# Patient Record
Sex: MALE | Race: WHITE | NOT HISPANIC OR LATINO | URBAN - METROPOLITAN AREA
[De-identification: names, ages, dates, MRNs, and addresses within clinical notes are randomized per-mention and may not be internally consistent; named-entity substitution may affect disease eponyms.]

---

## 2023-08-07 ENCOUNTER — HOSPITAL ENCOUNTER (OUTPATIENT)
Dept: DATA CONVERSION | Facility: HOSPITAL | Age: 68
Discharge: HOME | End: 2023-08-07

## 2023-08-07 ENCOUNTER — HOSPITAL ENCOUNTER (OUTPATIENT)
Dept: DATA CONVERSION | Facility: HOSPITAL | Age: 68
End: 2023-08-07

## 2023-08-07 DIAGNOSIS — M48.062 SPINAL STENOSIS, LUMBAR REGION WITH NEUROGENIC CLAUDICATION: ICD-10-CM

## 2025-01-16 ENCOUNTER — DOCUMENTATION (OUTPATIENT)
Dept: PHYSICAL THERAPY | Facility: CLINIC | Age: 70
End: 2025-01-16
Payer: MEDICARE

## 2025-01-16 NOTE — PROGRESS NOTES
Physical Therapy                 Therapy Communication Note    Patient Name: Anup Snow  MRN: [id number]  Encounter Date: 1/16/2025    Discipline: Physical Therapy    Missed Time: No Show    Comment:

## 2025-03-19 ENCOUNTER — APPOINTMENT (OUTPATIENT)
Dept: ORTHOPEDIC SURGERY | Facility: CLINIC | Age: 70
End: 2025-03-19
Payer: MEDICARE

## 2025-03-19 DIAGNOSIS — G95.9 CERVICAL MYELOPATHY: ICD-10-CM

## 2025-03-19 DIAGNOSIS — Z98.1 STATUS POST CERVICAL SPINAL FUSION: Primary | ICD-10-CM

## 2025-03-19 PROCEDURE — 1125F AMNT PAIN NOTED PAIN PRSNT: CPT | Performed by: ORTHOPAEDIC SURGERY

## 2025-03-19 PROCEDURE — 1159F MED LIST DOCD IN RCRD: CPT | Performed by: ORTHOPAEDIC SURGERY

## 2025-03-19 PROCEDURE — 1160F RVW MEDS BY RX/DR IN RCRD: CPT | Performed by: ORTHOPAEDIC SURGERY

## 2025-03-19 PROCEDURE — 1036F TOBACCO NON-USER: CPT | Performed by: ORTHOPAEDIC SURGERY

## 2025-03-19 RX ORDER — VALSARTAN 160 MG/1
160 TABLET ORAL DAILY
COMMUNITY

## 2025-03-19 RX ORDER — ACETAMINOPHEN 500 MG
1000 TABLET ORAL EVERY 8 HOURS PRN
COMMUNITY
Start: 2024-05-28

## 2025-03-19 RX ORDER — AMLODIPINE BESYLATE 10 MG/1
10 TABLET ORAL
COMMUNITY
Start: 2025-02-12 | End: 2025-05-13

## 2025-03-19 ASSESSMENT — PAIN - FUNCTIONAL ASSESSMENT: PAIN_FUNCTIONAL_ASSESSMENT: 0-10

## 2025-03-19 ASSESSMENT — ENCOUNTER SYMPTOMS
LOSS OF SENSATION IN FEET: 1
OCCASIONAL FEELINGS OF UNSTEADINESS: 0
DEPRESSION: 1

## 2025-03-19 ASSESSMENT — PAIN SCALES - GENERAL: PAINLEVEL_OUTOF10: 7

## 2025-03-19 ASSESSMENT — PAIN DESCRIPTION - DESCRIPTORS: DESCRIPTORS: ACHING;BURNING;RADIATING

## 2025-03-19 NOTE — PROGRESS NOTES
69-year-old male who made an appointment to see a spine specialist.  He presents a very disorganized history of years of varying pains in his neck and arms and back and legs and numbness.  He had surgery at [facility] on December 23.  My review of the records identify it as an ACDF at C5-6 by .  He also had a kyphoplasty at [facility] on January 9 with interventional radiology.  The patient has no understanding of what operations he had done or why.    The patient arrived late for his appointment and was immediately angry and verbally abusive to the staff.  He came up in a wheelchair aided by a volunteer but he admits that he drove himself to the hospital and he is fine driving.    He expected that I would have access to all of his medical information and his imaging and be able to give him a second opinion.    I pointed out that I have no access to the imaging of his MRIs, x-rays, CAT scans of his spine.  I strongly advised him to return to the surgeon who performed his surgery and the medical team supporting it.  I declined to take over his medical care or provide an opinion on his care.

## 2025-04-15 ENCOUNTER — HOSPITAL ENCOUNTER (EMERGENCY)
Facility: HOSPITAL | Age: 70
Discharge: HOME | End: 2025-04-16
Attending: STUDENT IN AN ORGANIZED HEALTH CARE EDUCATION/TRAINING PROGRAM
Payer: MEDICARE

## 2025-04-15 DIAGNOSIS — R03.0 ELEVATED BLOOD PRESSURE READING: ICD-10-CM

## 2025-04-15 DIAGNOSIS — F41.9 ANXIETY: Primary | ICD-10-CM

## 2025-04-15 PROCEDURE — 99284 EMERGENCY DEPT VISIT MOD MDM: CPT | Performed by: STUDENT IN AN ORGANIZED HEALTH CARE EDUCATION/TRAINING PROGRAM

## 2025-04-16 ENCOUNTER — APPOINTMENT (OUTPATIENT)
Dept: CARDIOLOGY | Facility: HOSPITAL | Age: 70
End: 2025-04-16
Payer: MEDICARE

## 2025-04-16 VITALS
HEART RATE: 67 BPM | HEIGHT: 71 IN | OXYGEN SATURATION: 97 % | WEIGHT: 216.27 LBS | SYSTOLIC BLOOD PRESSURE: 176 MMHG | RESPIRATION RATE: 18 BRPM | BODY MASS INDEX: 30.28 KG/M2 | DIASTOLIC BLOOD PRESSURE: 87 MMHG | TEMPERATURE: 97 F

## 2025-04-16 LAB
ALBUMIN SERPL BCP-MCNC: 4.5 G/DL (ref 3.4–5)
ALP SERPL-CCNC: 76 U/L (ref 33–136)
ALT SERPL W P-5'-P-CCNC: 20 U/L (ref 10–52)
AMPHETAMINES UR QL SCN: NORMAL
ANION GAP SERPL CALCULATED.3IONS-SCNC: 12 MMOL/L (ref 10–20)
APAP SERPL-MCNC: <10 UG/ML
APPEARANCE UR: CLEAR
AST SERPL W P-5'-P-CCNC: 18 U/L (ref 9–39)
ATRIAL RATE: 62 BPM
BARBITURATES UR QL SCN: NORMAL
BASOPHILS # BLD AUTO: 0.08 X10*3/UL (ref 0–0.1)
BASOPHILS NFR BLD AUTO: 0.8 %
BENZODIAZ UR QL SCN: NORMAL
BILIRUB SERPL-MCNC: 0.5 MG/DL (ref 0–1.2)
BILIRUB UR STRIP.AUTO-MCNC: NEGATIVE MG/DL
BUN SERPL-MCNC: 10 MG/DL (ref 6–23)
BZE UR QL SCN: NORMAL
CALCIUM SERPL-MCNC: 9.4 MG/DL (ref 8.6–10.3)
CANNABINOIDS UR QL SCN: NORMAL
CARDIAC TROPONIN I PNL SERPL HS: 8 NG/L (ref 0–20)
CARDIAC TROPONIN I PNL SERPL HS: 9 NG/L (ref 0–20)
CHLORIDE SERPL-SCNC: 101 MMOL/L (ref 98–107)
CO2 SERPL-SCNC: 29 MMOL/L (ref 21–32)
COLOR UR: COLORLESS
CREAT SERPL-MCNC: 0.83 MG/DL (ref 0.5–1.3)
EGFRCR SERPLBLD CKD-EPI 2021: >90 ML/MIN/1.73M*2
EOSINOPHIL # BLD AUTO: 0.13 X10*3/UL (ref 0–0.7)
EOSINOPHIL NFR BLD AUTO: 1.4 %
ERYTHROCYTE [DISTWIDTH] IN BLOOD BY AUTOMATED COUNT: 12.2 % (ref 11.5–14.5)
ETHANOL SERPL-MCNC: <10 MG/DL
FENTANYL+NORFENTANYL UR QL SCN: NORMAL
GLUCOSE SERPL-MCNC: 89 MG/DL (ref 74–99)
GLUCOSE UR STRIP.AUTO-MCNC: NORMAL MG/DL
HCT VFR BLD AUTO: 49.8 % (ref 41–52)
HGB BLD-MCNC: 16.7 G/DL (ref 13.5–17.5)
IMM GRANULOCYTES # BLD AUTO: 0.04 X10*3/UL (ref 0–0.7)
IMM GRANULOCYTES NFR BLD AUTO: 0.4 % (ref 0–0.9)
KETONES UR STRIP.AUTO-MCNC: NEGATIVE MG/DL
LEUKOCYTE ESTERASE UR QL STRIP.AUTO: NEGATIVE
LYMPHOCYTES # BLD AUTO: 3.08 X10*3/UL (ref 1.2–4.8)
LYMPHOCYTES NFR BLD AUTO: 32.4 %
MCH RBC QN AUTO: 31.4 PG (ref 26–34)
MCHC RBC AUTO-ENTMCNC: 33.5 G/DL (ref 32–36)
MCV RBC AUTO: 94 FL (ref 80–100)
METHADONE UR QL SCN: NORMAL
MONOCYTES # BLD AUTO: 1.07 X10*3/UL (ref 0.1–1)
MONOCYTES NFR BLD AUTO: 11.3 %
NEUTROPHILS # BLD AUTO: 5.11 X10*3/UL (ref 1.2–7.7)
NEUTROPHILS NFR BLD AUTO: 53.7 %
NITRITE UR QL STRIP.AUTO: NEGATIVE
NRBC BLD-RTO: 0 /100 WBCS (ref 0–0)
OPIATES UR QL SCN: NORMAL
OXYCODONE+OXYMORPHONE UR QL SCN: NORMAL
P AXIS: 59 DEGREES
P OFFSET: 197 MS
P ONSET: 152 MS
PCP UR QL SCN: NORMAL
PH UR STRIP.AUTO: 7 [PH]
PLATELET # BLD AUTO: 284 X10*3/UL (ref 150–450)
POTASSIUM SERPL-SCNC: 3.8 MMOL/L (ref 3.5–5.3)
PR INTERVAL: 130 MS
PROT SERPL-MCNC: 7 G/DL (ref 6.4–8.2)
PROT UR STRIP.AUTO-MCNC: NEGATIVE MG/DL
Q ONSET: 217 MS
QRS COUNT: 10 BEATS
QRS DURATION: 86 MS
QT INTERVAL: 414 MS
QTC CALCULATION(BAZETT): 420 MS
QTC FREDERICIA: 418 MS
R AXIS: 13 DEGREES
RBC # BLD AUTO: 5.32 X10*6/UL (ref 4.5–5.9)
RBC # UR STRIP.AUTO: NEGATIVE MG/DL
SALICYLATES SERPL-MCNC: <3 MG/DL
SODIUM SERPL-SCNC: 138 MMOL/L (ref 136–145)
SP GR UR STRIP.AUTO: 1.01
T AXIS: 39 DEGREES
T OFFSET: 424 MS
TSH SERPL-ACNC: 2.49 MIU/L (ref 0.44–3.98)
UROBILINOGEN UR STRIP.AUTO-MCNC: NORMAL MG/DL
VENTRICULAR RATE: 62 BPM
WBC # BLD AUTO: 9.5 X10*3/UL (ref 4.4–11.3)

## 2025-04-16 PROCEDURE — 80053 COMPREHEN METABOLIC PANEL: CPT | Performed by: STUDENT IN AN ORGANIZED HEALTH CARE EDUCATION/TRAINING PROGRAM

## 2025-04-16 PROCEDURE — 84484 ASSAY OF TROPONIN QUANT: CPT | Performed by: STUDENT IN AN ORGANIZED HEALTH CARE EDUCATION/TRAINING PROGRAM

## 2025-04-16 PROCEDURE — 2500000001 HC RX 250 WO HCPCS SELF ADMINISTERED DRUGS (ALT 637 FOR MEDICARE OP): Performed by: STUDENT IN AN ORGANIZED HEALTH CARE EDUCATION/TRAINING PROGRAM

## 2025-04-16 PROCEDURE — 36415 COLL VENOUS BLD VENIPUNCTURE: CPT | Performed by: STUDENT IN AN ORGANIZED HEALTH CARE EDUCATION/TRAINING PROGRAM

## 2025-04-16 PROCEDURE — 2500000001 HC RX 250 WO HCPCS SELF ADMINISTERED DRUGS (ALT 637 FOR MEDICARE OP)

## 2025-04-16 PROCEDURE — 80320 DRUG SCREEN QUANTALCOHOLS: CPT | Performed by: STUDENT IN AN ORGANIZED HEALTH CARE EDUCATION/TRAINING PROGRAM

## 2025-04-16 PROCEDURE — 2500000002 HC RX 250 W HCPCS SELF ADMINISTERED DRUGS (ALT 637 FOR MEDICARE OP, ALT 636 FOR OP/ED): Performed by: STUDENT IN AN ORGANIZED HEALTH CARE EDUCATION/TRAINING PROGRAM

## 2025-04-16 PROCEDURE — 85025 COMPLETE CBC W/AUTO DIFF WBC: CPT | Performed by: STUDENT IN AN ORGANIZED HEALTH CARE EDUCATION/TRAINING PROGRAM

## 2025-04-16 PROCEDURE — 81003 URINALYSIS AUTO W/O SCOPE: CPT | Mod: 59 | Performed by: STUDENT IN AN ORGANIZED HEALTH CARE EDUCATION/TRAINING PROGRAM

## 2025-04-16 PROCEDURE — 80307 DRUG TEST PRSMV CHEM ANLYZR: CPT | Performed by: STUDENT IN AN ORGANIZED HEALTH CARE EDUCATION/TRAINING PROGRAM

## 2025-04-16 PROCEDURE — 84443 ASSAY THYROID STIM HORMONE: CPT | Performed by: STUDENT IN AN ORGANIZED HEALTH CARE EDUCATION/TRAINING PROGRAM

## 2025-04-16 PROCEDURE — 93005 ELECTROCARDIOGRAM TRACING: CPT

## 2025-04-16 RX ORDER — HYDROXYZINE HYDROCHLORIDE 25 MG/1
25 TABLET, FILM COATED ORAL EVERY 6 HOURS PRN
Status: DISCONTINUED | OUTPATIENT
Start: 2025-04-16 | End: 2025-04-16 | Stop reason: HOSPADM

## 2025-04-16 RX ORDER — NAPROXEN SODIUM 220 MG/1
TABLET, FILM COATED ORAL
Status: COMPLETED
Start: 2025-04-16 | End: 2025-04-16

## 2025-04-16 RX ORDER — HYDROCHLOROTHIAZIDE 25 MG/1
25 TABLET ORAL ONCE
Status: COMPLETED | OUTPATIENT
Start: 2025-04-16 | End: 2025-04-16

## 2025-04-16 RX ORDER — AMLODIPINE BESYLATE 5 MG/1
10 TABLET ORAL ONCE
Status: COMPLETED | OUTPATIENT
Start: 2025-04-16 | End: 2025-04-16

## 2025-04-16 RX ORDER — LORAZEPAM 1 MG/1
2 TABLET ORAL ONCE
Status: COMPLETED | OUTPATIENT
Start: 2025-04-16 | End: 2025-04-16

## 2025-04-16 RX ORDER — VALSARTAN 80 MG/1
160 TABLET ORAL ONCE
Status: COMPLETED | OUTPATIENT
Start: 2025-04-16 | End: 2025-04-16

## 2025-04-16 RX ADMIN — AMLODIPINE BESYLATE 10 MG: 5 TABLET ORAL at 07:29

## 2025-04-16 RX ADMIN — HYDROCHLOROTHIAZIDE 25 MG: 25 TABLET ORAL at 07:29

## 2025-04-16 RX ADMIN — VALSARTAN 160 MG: 80 TABLET, FILM COATED ORAL at 07:29

## 2025-04-16 RX ADMIN — ASPIRIN 81 MG CHEWABLE TABLET: 81 TABLET CHEWABLE at 02:34

## 2025-04-16 RX ADMIN — LORAZEPAM 2 MG: 1 TABLET ORAL at 00:39

## 2025-04-16 SDOH — HEALTH STABILITY: MENTAL HEALTH: WISH TO BE DEAD (PAST 1 MONTH): NO

## 2025-04-16 SDOH — ECONOMIC STABILITY: HOUSING INSECURITY: FEELS SAFE LIVING IN HOME: YES

## 2025-04-16 SDOH — HEALTH STABILITY: MENTAL HEALTH: IN THE PAST FEW WEEKS, HAVE YOU WISHED YOU WERE DEAD?: NO

## 2025-04-16 SDOH — HEALTH STABILITY: MENTAL HEALTH: SUICIDAL BEHAVIOR (LIFETIME): NO

## 2025-04-16 SDOH — HEALTH STABILITY: MENTAL HEALTH: HAVE YOU EVER TRIED TO KILL YOURSELF?: NO

## 2025-04-16 SDOH — HEALTH STABILITY: MENTAL HEALTH: NON-SPECIFIC ACTIVE SUICIDAL THOUGHTS (PAST 1 MONTH): NO

## 2025-04-16 SDOH — HEALTH STABILITY: MENTAL HEALTH: ARE YOU HAVING THOUGHTS OF KILLING YOURSELF RIGHT NOW?: NO

## 2025-04-16 SDOH — HEALTH STABILITY: MENTAL HEALTH: IN THE PAST FEW WEEKS, HAVE YOU FELT THAT YOU OR YOUR FAMILY WOULD BE BETTER OFF IF YOU WERE DEAD?: NO

## 2025-04-16 SDOH — HEALTH STABILITY: MENTAL HEALTH: DEPRESSION SYMPTOMS: CHANGE IN ENERGY LEVEL

## 2025-04-16 SDOH — HEALTH STABILITY: MENTAL HEALTH: IN THE PAST WEEK, HAVE YOU BEEN HAVING THOUGHTS ABOUT KILLING YOURSELF?: NO

## 2025-04-16 SDOH — HEALTH STABILITY: MENTAL HEALTH: ANXIETY SYMPTOMS: GENERALIZED

## 2025-04-16 ASSESSMENT — LIFESTYLE VARIABLES
SUBSTANCE_ABUSE_PAST_12_MONTHS: NO
PRESCIPTION_ABUSE_PAST_12_MONTHS: NO

## 2025-04-16 ASSESSMENT — PAIN SCALES - GENERAL: PAINLEVEL_OUTOF10: 0 - NO PAIN

## 2025-04-16 ASSESSMENT — COLUMBIA-SUICIDE SEVERITY RATING SCALE - C-SSRS
6. HAVE YOU EVER DONE ANYTHING, STARTED TO DO ANYTHING, OR PREPARED TO DO ANYTHING TO END YOUR LIFE?: NO
2. HAVE YOU ACTUALLY HAD ANY THOUGHTS OF KILLING YOURSELF?: NO
1. IN THE PAST MONTH, HAVE YOU WISHED YOU WERE DEAD OR WISHED YOU COULD GO TO SLEEP AND NOT WAKE UP?: NO

## 2025-04-16 ASSESSMENT — PAIN - FUNCTIONAL ASSESSMENT: PAIN_FUNCTIONAL_ASSESSMENT: 0-10

## 2025-04-16 NOTE — ED PROVIDER NOTES
EMERGENCY DEPARTMENT ENCOUNTER      Pt Name: Anup Snow  MRN: [id number]  Birthdate 1955  Date of evaluation: 4/15/2025  Provider: January Baptiste DO         Chief Complaint   Patient presents with    Hypertension     Per patient he states that he wasn't feeling that great and decided to take his bp at home. It was runing about 240 systolically. Patient was concerned and wanted to come in. Patient denies any pain or other complaints at this time.        HPI     69-year-old male with past medical history significant for prior back pain and hypertension it has been difficult to control although he has been attempting this with his primary care provider, states that he is having worsening stress and anxiety because he has been unable to care for his wife at home and she had to go into a nursing facility, and he does not have a lot of social support from his kids or other help at home.  He endorses significant anxiety, and significant thoughts of guilt and hopelessness.  He also endorses waxing and waning chest pains and chest tightness and severe anxiety particularly over the last 5 or 6 days.  It is associated with nausea but he has no fevers, no coughing, no nasal congestion, no difficulty breathing.  He is peeing and pooping normally.              Patient History   No past medical history on file.  No past surgical history on file.  No family history on file.  Social History     Tobacco Use    Smoking status: Never    Smokeless tobacco: Never   Vaping Use    Vaping status: Not on file   Substance Use Topics    Alcohol use: Defer    Drug use: Not on file       Physical Exam   ED Triage Vitals   Temperature Heart Rate Respirations BP   04/15/25 2348 04/15/25 2348 04/15/25 2348 04/15/25 2351   36.1 °C (97 °F) 69 15 (!) 234/112      Pulse Ox Temp Source Heart Rate Source Patient Position   04/15/25 2348 04/15/25 2348 04/15/25 2348 04/15/25 2348   98 % Skin Monitor Sitting      BP Location FiO2 (%)     04/15/25  2348 --     Left arm        Physical Exam  Vitals and nursing note reviewed.   Constitutional:       General: He is not in acute distress.     Appearance: He is well-developed.   HENT:      Head: Normocephalic and atraumatic.   Eyes:      Conjunctiva/sclera: Conjunctivae normal.   Cardiovascular:      Rate and Rhythm: Normal rate and regular rhythm.      Heart sounds: No murmur heard.  Pulmonary:      Effort: Pulmonary effort is normal. No respiratory distress.      Breath sounds: Normal breath sounds.   Abdominal:      Palpations: Abdomen is soft.      Tenderness: There is no abdominal tenderness.   Musculoskeletal:         General: No swelling.      Cervical back: Neck supple.   Skin:     General: Skin is warm and dry.      Capillary Refill: Capillary refill takes less than 2 seconds.   Neurological:      General: No focal deficit present.      Mental Status: He is alert and oriented to person, place, and time.   Psychiatric:         Mood and Affect: Affect is tearful.         Results:  Abnormal Labs Reviewed   CBC WITH AUTO DIFFERENTIAL - Abnormal; Notable for the following components:       Result Value    Monocytes Absolute 1.07 (*)     All other components within normal limits   URINALYSIS WITH REFLEX CULTURE AND MICROSCOPIC - Abnormal; Notable for the following components:    Color, Urine Colorless (*)     All other components within normal limits       All other labs were normal or not returned as of the time of this dictation.     No orders to display         ED Course & MDM   Anpu Snow is a 69 y.o. male presenting to the ED for evaluation of had concerns including Hypertension (Per patient he states that he wasn't feeling that great and decided to take his bp at home. It was runing about 240 systolically. Patient was concerned and wanted to come in. Patient denies any pain or other complaints at this time. ).. External records reviewed: I reviewed external records including outpatient, PCP records,  and prior discharge summaries.    Medical Decision Making  Hypertensive on arrival, 234/112, despite reported med compliance, suspect anxiety may be a component, cardiac workup initiated, the patient was given 2 of oral Ativan, urine drug screen obtained and tox screen obtained and EPAT was consulted.         ED Course as of 04/16/25 0432   Wed Apr 16, 2025   0234 Troponin I, High Sensitivity: 8 [AS]      ED Course User Index  [AS] January Baptiste, DO           Procedure  Procedures            January Baptiste, DO  Emergency Medicine    The above documentation was completed with the use of speech recognition software. It may contain dictation errors secondary to limitations of the software.      ED Medications administered this visit:    Medications   LORazepam (Ativan) tablet 2 mg (2 mg oral Given 4/16/25 0039)       New Prescriptions from this visit:    New Prescriptions    No medications on file       Final Impression: No diagnosis found.      (Please note that portions of this note were completed with a voice recognition program.  Efforts were made to edit the dictations but occasionally words are mis-transcribed.)   with a voice recognition program.  Efforts were made to edit the dictations but occasionally words are mis-transcribed.)     January Baptiste DO  04/29/25 0701

## 2025-04-16 NOTE — PROGRESS NOTES
"EPAT - Social Work Psychiatric Assessment    Arrival Details  Admission Source: Home  Admission Type: Voluntary  EPAT Assessment Start Date: 04/16/25  EPAT Assessment Start Time: 0851  Name of : AMNA Pope    History of Present Illness  Admission Reason: Pt is a 68y/o male presenting [facility] ED for anxiety and hypertension.  HPI: Pt chart, triage and provider notes reviewed prior to assessment, triage assessment reflects \"no risk indicated\". Pt presented to ED after not feeling well yesterday and taking his blood pressure medication. Pt voiced increased anxiety due to his wife recently going to a  SNF. Pt denies any MH history. Pt denies SI/HI/VH/AH.    SW Readmission Information   Readmission within 30 Days: No    Psychiatric Symptoms  Anxiety Symptoms: Generalized  Depression Symptoms: Change in energy level  Amee Symptoms: No problems reported or observed.    Psychosis Symptoms  Hallucination Type: No problems reported or observed.  Delusion Type: No problems reported or observed.    Additional Symptoms - Adult  Generalized Anxiety Disorder: Difficult to control worry, Difficulity concentrating, Irritability, Sleep disturbance, Excessive anxiety/worry  Obsessive Compulsive Disorder: No problems reported or observed.  Panic Attack: No problems reported or observed.  Post Traumatic Stress Disorder: No problems reported or observed.  Delirium: No problems reported or observed.    Past Psychiatric History/Meds/Treatments  Past Psychiatric History: Pt denies.  Past Psychiatric Meds/Treatments: Pt denies.  Past Violence/Victimization History: Denies    Current Mental Health Contacts   Name/Phone Number: N/A  Provider Name/Phone Number: N/A    Support System: Immediate family (reports good support including his mother)    Living Arrangement: Lives with someone (lives w/ mother)    Home Safety  Feels Safe Living in Home: Yes  Potentially Unsafe Housing Conditions: Unable to Assess  Home " Safety : No concerns reported.                   Legal  Legal Considerations: Patient/ Family Ability to Make Healthcare Decisions  Criminal Activity/ Legal Involvement Pertinent to Current Situation/ Hospitalization: None reported  Legal Concerns: None reported    Drug Screening  Have you used any substances (canabis, cocaine, heroin, hallucinogens, inhalants, etc.) in the past 12 months?: No  Have you used any prescription drugs other than prescribed in the past 12 months?: No  Is a toxicology screen needed?: Yes         Behavioral Health  Behavioral Health(WDL): Within Defined Limits    Orientation  Orientation Level: Oriented X4, Appropriate for developmental age    General Appearance  Motor Activity: Unremarkable  Speech Pattern: Other (Comment) (Unremarkable)  General Attitude: Attentive, Cooperative, Pleasant  Appearance/Hygiene: Unremarkable    Thought Process  Coherency: Snyder thinking  Content: Unremarkable  Delusions: Other (Comment) (none reported)  Perception: Not altered  Hallucination: None  Judgment/Insight:  (appropriate)  Confusion: None  Cognition: Appropriate judgement, Appropriate safety awareness, Appropriate for developmental age, Appropriate attention/concentration, Follows commands    Sleep Pattern  Sleep Pattern: Difficulty falling asleep, Disturbed/interrupted sleep    Risk Factors  Self Harm/Suicidal Ideation Plan: Pt denies  Previous Self Harm/Suicidal Plans: Pt denies  Risk Factors: Male  Description of Thoughts/Ideas Leaving Unit Now: Pt denies    Violence Risk Assessment  Assessment of Violence: None noted  Thoughts of Harm to Others: No    Ability to Assess Risk Screen  Risk Screen - Ability to Assess: Able to be screened  Ask Suicide-Screening Questions  1. In the past few weeks, have you wished you were dead?: No  2. In the past few weeks, have you felt that you or your family would be better off if you were dead?: No  3. In the past week, have you been having thoughts about  killing yourself?: No  4. Have you ever tried to kill yourself?: No  5. Are you having thoughts of killing yourself right now?: No  Calculated Risk Score: No intervention is necessary  Clay Suicide Severity Rating Scale (Screener/Recent Self-Report)  1. Wish to be Dead (Past 1 Month): No  2. Non-Specific Active Suicidal Thoughts (Past 1 Month): No  6. Suicidal Behavior (Lifetime): No  Calculated C-SSRS Risk Score (Lifetime/Recent): No Risk Indicated  Step 1: Risk Factors  Current & Past Psychiatric Dx: Recent onset  Presenting Symptoms: Anxiety and/or panic  Precipitants/Stressors: Triggering events leading to humiliation, shame, and/or despair (e.g. loss of relationship, financial or health status) (real or anticipated)  Change in Treatment: Non-compliant or not receiving treatment  Step 2: Protective Factors   Protective Factors Internal: Ability to cope with stress, Identifies reasons for living  Protective Factors External: Supportive social network or family or friends  Step 5: Documentation  Risk Level: Low suicide risk    Psychiatric Impression and Plan of Care  Assessment and Plan: Met [facility] w/ pt for assessment. Pt sleeping prior to assessment but continues resting. Pt calm and cooperative w/ euthymic mood. Pt reports he came in yesterday due to feeling his blood pressure elevating. Pt explains that he has been having anxiety since his wife recently moved to a SNF. Pt reports this causes changes to his heart rate and has made sleep difficult. Pt reports that he has been living with his mother and she is supportive. Pt denies any previous mental health history, as well as any current or previous SI/attempts. Pt not currently receiving mental health treatment and declines offers for resources. Pt shares that although he has been feeling anxious, he has adequate support and ability to cope at this time. Pt does not meet criteria for inpatient admission.  Specific Resources Provided to Patient: Peer support  not needed.  CM Notified: N/A  PHP/IOP Recommended: N/A  Specific Information Provided for PHP/IOP: N/A  Plan Comments: Diagnostic impression: adjustment disorder w/ anxiety    Outcome/Disposition  Patient's Perception of Outcome Achieved: in agreement  Assessment, Recommendations and Risk Level Reviewed with: Dr Wells  Contact Name: N/A  Contact Number(s): N/A  EPAT Assessment Completed Date: 04/16/25  EPAT Assessment Completed Time: 0901  Patient Disposition: Home

## 2025-04-16 NOTE — PROGRESS NOTES
Social Work Note    I have had a discussion with the patient about warning signs that their condition is worsening, and they should consider returning to the ED and/or calling 911    The patient has identified appropriate internal coping strategies and has people and social settings that provide distraction and support.    The patient has a person who they can talk to in a crisis.  I have offered to contact this individual  No - patient has declined that I reach out.

## 2025-09-12 ENCOUNTER — APPOINTMENT (OUTPATIENT)
Dept: OPHTHALMOLOGY | Facility: CLINIC | Age: 70
End: 2025-09-12
Payer: MEDICARE